# Patient Record
Sex: MALE | Race: WHITE
[De-identification: names, ages, dates, MRNs, and addresses within clinical notes are randomized per-mention and may not be internally consistent; named-entity substitution may affect disease eponyms.]

---

## 2019-02-02 ENCOUNTER — HOSPITAL ENCOUNTER (EMERGENCY)
Dept: HOSPITAL 80 - FED | Age: 19
Discharge: HOME | End: 2019-02-02
Payer: COMMERCIAL

## 2019-02-02 VITALS — DIASTOLIC BLOOD PRESSURE: 87 MMHG | SYSTOLIC BLOOD PRESSURE: 126 MMHG
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
## 2019-02-02 DIAGNOSIS — S01.81XA: Primary | ICD-10-CM

## 2019-02-02 PROCEDURE — 0HQ1XZZ REPAIR FACE SKIN, EXTERNAL APPROACH: ICD-10-PCS

## 2019-02-02 NOTE — EDPHY
H & P


Time Seen by Provider: 02/02/19 16:42


HPI/ROS: 





Chief complaint:  Chin laceration





History of present illness:  This is a 18-year-old male who presents to the 

emergency department for evaluation of chin laceration.  Approximately 40 min 

ago he was wrestling with friends when he struck his chin against the ground 

cutting it open.  Minimal pain.  Bleeding has been controlled with a dressing.  

He denies further trauma.  He states his jaw does not hurt.  No injury to the 

teeth.  He has a normal bite.  Further no report of trauma to the rest of the 

head, neck or other parts of the body.  No headache or neck pain.  His tetanus 

is up-to-date.


Smoking Status: Never smoked


Physical Exam: 





General:  Alert, nontoxic.


Skin:  Patient has a 1.5 cm stellate laceration to the right side of the chin.


ENT:  No hemotympanum, no laurent sign, no raccoon eyes.


Mouth:  Patient is opening closing his mouth without difficulty.  He has a 

normal bite.  He is able to bite down and hold a tongue depressor against 

resistance bilaterally.


Musculoskeletal:  The mandible as well as the rest the face is nontender, no 

deformity noted.  The head is nontender.  The spine is nontender.  He is moving 

all extremities well.


Neurological:  Alert and oriented x4.  Cranial nerves 2-12 grossly intact.  

Strength and sensation intact and symmetrical





Constitutional: 


 Initial Vital Signs











Temperature (C)  36.9 C   02/02/19 16:27


 


Heart Rate  79   02/02/19 16:27


 


Respiratory Rate  17   02/02/19 16:27


 


Blood Pressure  126/87 H  02/02/19 16:27


 


O2 Sat (%)  94   02/02/19 16:27








 











O2 Delivery Mode               Room Air














Allergies/Adverse Reactions: 


 





No Known Allergies Allergy (Unverified 02/02/19 16:27)


 








Home Medications: 














 Medication  Instructions  Recorded


 


NK [No Known Home Meds]  02/02/19














MDM/Departure





- MDM


Procedures: 





Procedure:  Laceration repair.





Verbal consent was obtained from the patient. The 1.5 cm stellate laceration on 

the right chin was anesthetized in the usual fashion. The wound was irrigated, 

draped and explored to its base with a gloved finger. There were no deep 

structures involved.  No tendon injury was identified.  The wound was repaired 

with 6 0 Prolene, 5 simple interrupted sutures.  The wound repair was simple.  

The procedure was performed by myself.


ED Course/Re-evaluation: 





Patient seen under the supervision of my secondary supervising physician Dr. Rodo King.  Patient presents for laceration to his right chin.  His tetanus 

is up-to-date.  By history and physical exam no evidence of further trauma.  

The wound is anesthetized, cleaned and repaired.  Wound care and scar 

management was discussed.  He is asked to follow up with primary care doctor.  

Stitches to be removed in 5-7 days.  Return precautions were given.


Differential Diagnosis: 





Included but not limited to contusion, abrasion, laceration, deep structure 

injury, foreign body contamination





- Depart


Disposition: Home, Routine, Self-Care


Clinical Impression: 


Laceration of chin


Qualifiers:


 Encounter type: initial encounter Qualified Code(s): S01.81XA - Laceration 

without foreign body of other part of head, initial encounter





Condition: Good


Instructions:  Care For Your Stitches (ED), Laceration (ED), Acute Wounds (ED)


Additional Instructions: 


Please follow-up with a primary care doctor for continued evaluation and care





Keep wound clean with soap and water 2-3 times daily, afterwards apply 

antibacterial ointment and a Band-Aid





Stitches are to be removed in 5-7 days





If symptoms worsen or new symptoms develop return to the emergency department 

for recheck


Referrals: 


NONE *PRIMARY CARE P,. [Primary Care Provider] - As per Instructions


Lauro Dorantes MD [Medical Doctor] - As per Instructions